# Patient Record
Sex: MALE | Race: WHITE | NOT HISPANIC OR LATINO | URBAN - METROPOLITAN AREA
[De-identification: names, ages, dates, MRNs, and addresses within clinical notes are randomized per-mention and may not be internally consistent; named-entity substitution may affect disease eponyms.]

---

## 2017-02-25 ENCOUNTER — EMERGENCY (EMERGENCY)
Facility: HOSPITAL | Age: 37
LOS: 1 days | Discharge: HOME | End: 2017-02-25
Attending: EMERGENCY MEDICINE | Admitting: EMERGENCY MEDICINE
Payer: COMMERCIAL

## 2017-02-25 VITALS
DIASTOLIC BLOOD PRESSURE: 78 MMHG | OXYGEN SATURATION: 98 % | HEART RATE: 81 BPM | TEMPERATURE: 98 F | SYSTOLIC BLOOD PRESSURE: 116 MMHG | RESPIRATION RATE: 17 BRPM

## 2017-02-25 DIAGNOSIS — H57.8 OTHER SPECIFIED DISORDERS OF EYE AND ADNEXA: ICD-10-CM

## 2017-02-25 PROCEDURE — 99283 EMERGENCY DEPT VISIT LOW MDM: CPT | Mod: 25

## 2017-02-25 PROCEDURE — 99283 EMERGENCY DEPT VISIT LOW MDM: CPT

## 2017-02-25 NOTE — ED PROVIDER NOTE - OBJECTIVE STATEMENT
35 y/o M tory s/p accidentally applying aluminium sulfate  to his right eye approx 1 hour ago . Parth Mcdaniel DO: 35 y/o M tory s/p accidentally applying aluminium sulfate 65% to his right eye approx 1 hour ago . He had been reaching for his abx eye drops prescribed by his PMD. Had immediate pain and irritation so called EMS. No visual changes. EMS called poison control en route, recommend irrigation.  Irrigation en route to hospital.

## 2017-02-25 NOTE — ED PROVIDER NOTE - PHYSICAL EXAMINATION
Parth Mcdaniel DO:   Gen: well appearing without any distress. AAOx3.  HEENT: bilateral eye erythema. PERRL, EOMI, atraumatic  Neck: supple  Heart: RRR, S1S2  Lungs: CTA bl. no w/r/r  Neuro: Grossly intact. Normal gait.    SLit lamp exam R eye: externally mild conj erythema. pupil equal and reactive. No fluorescein uptake, no foreign bodies visualized. no flare or cells visualized in AC. Parth Mcdaniel DO:   Gen: well appearing without any distress. AAOx3.  HEENT: bilateral eye erythema. PERRL, EOMI, atraumatic  Neuro: Grossly intact. Normal gait.    SLit lamp exam R eye: externally mild conj erythema. pupil equal and reactive. No fluorescein uptake, no foreign bodies visualized. no flare or cells visualized in AC.

## 2017-02-25 NOTE — ED PROVIDER NOTE - ATTENDING CONTRIBUTION TO CARE
36M accidentally put aluminum sulfate 65% in R eye while reaching for his eye drops.     On exam, b/l conj erythema.     A: Chemical Eye irritation  P: yelena lens irrigiation, tetracaine, check ph. will check with tox if additional treatment necessary. 36M accidentally put aluminum sulfate 65% in R eye while reaching for his eye drops.     On exam, b/l conj erythema.     A: Chemical Eye irritation  P: yelena lens irrigation, tetracaine, check ph. will check with tox if additional treatment necessary.

## 2017-02-25 NOTE — ED PROVIDER NOTE - PROGRESS NOTE DETAILS
Parth Mcdaniel DO: Pt states feels much better and wishes to go home. Will dc. Parth Mcdaniel DO: Pt states feels much better and wishes to go home. Will dc. PT states gave his abx eye drops to EMS, will provide with oflaxacin eye drops as was started by his PMD. FU pmd in 24-48 hours.

## 2017-02-25 NOTE — ED ADULT NURSE NOTE - OBJECTIVE STATEMENT
37 yo male A&OX3 presents to the ED with the c/o r eye burning. Pt states that he put aluminum sulfate 65% in his eye by accident. Pt states that he thought that he was putting his eye drops in. Currently c/o blurry vision and pain in eye. + eye tearing. no pus or blood.

## 2017-03-02 ENCOUNTER — APPOINTMENT (OUTPATIENT)
Dept: OPHTHALMOLOGY | Facility: CLINIC | Age: 37
End: 2017-03-02

## 2017-03-02 DIAGNOSIS — H16.291: ICD-10-CM

## 2017-10-31 ENCOUNTER — EMERGENCY (EMERGENCY)
Facility: HOSPITAL | Age: 37
LOS: 1 days | Discharge: HOME | End: 2017-10-31
Admitting: EMERGENCY MEDICINE
Payer: COMMERCIAL

## 2017-10-31 VITALS
SYSTOLIC BLOOD PRESSURE: 144 MMHG | OXYGEN SATURATION: 100 % | RESPIRATION RATE: 16 BRPM | HEART RATE: 97 BPM | DIASTOLIC BLOOD PRESSURE: 95 MMHG

## 2017-10-31 VITALS
HEART RATE: 88 BPM | SYSTOLIC BLOOD PRESSURE: 159 MMHG | TEMPERATURE: 98 F | DIASTOLIC BLOOD PRESSURE: 101 MMHG | OXYGEN SATURATION: 97 % | RESPIRATION RATE: 20 BRPM

## 2017-10-31 PROCEDURE — 99283 EMERGENCY DEPT VISIT LOW MDM: CPT | Mod: 25

## 2017-10-31 PROCEDURE — 99283 EMERGENCY DEPT VISIT LOW MDM: CPT

## 2017-10-31 NOTE — ED ADULT NURSE NOTE - OBJECTIVE STATEMENT
37 year old male alert and oriented x 4 came to the ED c/o foreign body in the right eye.  Patient said he felt something in his eye about 2 hours prior to arrival in the ED.  Patient said he was blinking constantly from irritation.  Patient irrigated his eye for about 2 hours and said while he was in triage the eye irritation went away. Patient said he wasn't sure what was in his eye and denies any discharge from the eye or visual changes.  No redness noted in the eye.  Patient's pupils are 3mm equal and reactive to light.  Patient has no other signs of injury or deformity noted.  Safety ensured.

## 2017-10-31 NOTE — ED PROVIDER NOTE - OBJECTIVE STATEMENT
11pm.  better:  irrigation.  worse - none.  context: was sitting at home when FB sensation began in eye.

## 2017-10-31 NOTE — ED PROVIDER NOTE - MEDICAL DECISION MAKING DETAILS
36 yo M, resolved FB sensation in L eye w/o acuity compromise.  Slit lamp exam unremarkable.  Patient feels better prior to MD exam.  Irrigated at home.  No Hx that increases concern for metal FB.  No FB or abrasion visualized on exam.  Impression:  FB R eye, removed prior to ED arrival.  Plan:  d/c home, return precautions.

## 2017-10-31 NOTE — ED PROVIDER NOTE - PHYSICAL EXAMINATION
OD:  20/25, OS:  20/25, both: 20/25.  Fluorescein:  no abrasion.  EOMI.  No FB on exam. OD:  20/25, OS:  20/25, both: 20/25.  Fluorescein:  no abrasion.  EOMI.  No FB on exam.  Slit Lamp:  no cell and flare.  PERRL.

## 2018-07-10 ENCOUNTER — TRANSCRIPTION ENCOUNTER (OUTPATIENT)
Age: 38
End: 2018-07-10

## 2020-11-22 ENCOUNTER — EMERGENCY (EMERGENCY)
Facility: HOSPITAL | Age: 40
LOS: 1 days | Discharge: HOME | End: 2020-11-22
Attending: EMERGENCY MEDICINE
Payer: COMMERCIAL

## 2020-11-22 VITALS
OXYGEN SATURATION: 100 % | SYSTOLIC BLOOD PRESSURE: 131 MMHG | WEIGHT: 220.02 LBS | DIASTOLIC BLOOD PRESSURE: 90 MMHG | RESPIRATION RATE: 18 BRPM | TEMPERATURE: 99 F | HEIGHT: 68 IN | HEART RATE: 105 BPM

## 2020-11-22 PROCEDURE — 99284 EMERGENCY DEPT VISIT MOD MDM: CPT

## 2020-11-22 PROCEDURE — 99283 EMERGENCY DEPT VISIT LOW MDM: CPT

## 2020-11-23 VITALS
TEMPERATURE: 99 F | HEART RATE: 107 BPM | RESPIRATION RATE: 17 BRPM | OXYGEN SATURATION: 100 % | SYSTOLIC BLOOD PRESSURE: 123 MMHG | DIASTOLIC BLOOD PRESSURE: 89 MMHG

## 2020-11-23 NOTE — ED PROVIDER NOTE - CARE PLAN
Principal Discharge DX:	Finger laceration  Goal:	damage to the bed of the nail, initial encounter, left index finger

## 2020-11-23 NOTE — ED PROVIDER NOTE - SKIN COLOR
very small laceration at base of L index finger nail and extending into volar aspect, about 1cm, very superficial. No tenderness over DIP, neurovascular intact, full ROM

## 2020-11-23 NOTE — ED PROVIDER NOTE - OBJECTIVE STATEMENT
41 y/o M p/w L 2nd finger injury. Pt reports he was cutting carrots 2 days ago when he cut his L 2nd finger. Last tetanus was less than 5 years ago.

## 2020-11-23 NOTE — ED ADULT NURSE NOTE - OBJECTIVE STATEMENT
40 Y M presents to the ED with a 2 lacerations on his L index finger. reports that yesterday he was cutting carrots 1/4 inch and cut under his finger nail and then today was using a rotator saw that goes 3000mph and cut where his cuticle 1/2 cm is but is unsure how deep it is. Lacerations are not actively bleeding. Reports that his tetanus shot is up to date. reports that he cleaned it with hydrogen peroxide, and called his PMD who told him to come here. On assessment, A&Ox4. Breathing spontaneously and unlabored on room air. Skin intact and normal for race. Pt safety maintained. Call bell within reach. Side rails in upward position. Seen and evaluated by MD. Awaiting dispo.

## 2020-11-23 NOTE — ED PROVIDER NOTE - PATIENT PORTAL LINK FT
You can access the FollowMyHealth Patient Portal offered by [facility] by registering at the following website: [email or web address]. By joining [employer]’s FollowMyHealth portal, you will also be able to view your health information using other applications (apps) compatible with our system.

## 2020-11-23 NOTE — ED PROVIDER NOTE - CLINICAL SUMMARY MEDICAL DECISION MAKING FREE TEXT BOX
41 y/o M p/w very small laceration at base of L index finger nail and extending into volar aspect about 1 cm. Very superficial, does not require suturing. Cleaned with normal saline, applied Bacitracin. Pt states UTD with tetanus.

## 2020-11-23 NOTE — ED ADULT NURSE NOTE - NSFALLRSKASSESSDT_ED_ALL_ED
[place] HIGHTOWER reviewed MRI C SPine results; noted changes in the neck and degenerative arthiritis, irriration of the nerves. Nothing severly pincing on the spinal cord so would advise continuing with EMG to check for Neuropathy.     If neck pain continues could try PT.     Since did not tolerate some medications could try Baclofen 10 MG nightly and rotate with Ibufpren to decrease inflammation.    October - schedule follow up.   
Had an MRI a month ago and dont know the DX yet.  Ilda ordered it.  Also has question on a medication he would like to try.  Still has stiff neck    Ask what pharmacy if you give him a medication to try  
Patient seen in May 2019 for neck pain and at least 1 year to a year and half duration of progressive BLE numbness sensation of stiffness and weakness.    EMG ordered and scheduled for 8/15/19 ? Neuropathic Findings   MRI -  C Spine - patient requesting results.     No follow up scheduled at this time.     Not tolerating Amitriptyline (ordered by PCP Aprill of this year)  Tried Flexeril without much relief.           
23-Nov-2020 00:39

## 2020-11-23 NOTE — ED PROVIDER NOTE - NS_ ATTENDINGSCRIBEDETAILS _ED_A_ED_FT
I reviewed the scribe's note and agree with the documented findings and plan of care.  Livia Alexander MD

## 2020-11-23 NOTE — ED PROVIDER NOTE - NSFOLLOWUPINSTRUCTIONS_ED_ALL_ED_FT
1. Return to ED for worsening, progressive or any other concerning symptoms   2. Follow up with your primary care doctor in 2-3days  3. Apply bacitracin teice per day, keep drive

## 2021-03-16 ENCOUNTER — EMERGENCY (EMERGENCY)
Facility: HOSPITAL | Age: 41
LOS: 1 days | Discharge: LEFT BEFORE DISCHARGE | End: 2021-03-16
Attending: EMERGENCY MEDICINE
Payer: COMMERCIAL

## 2021-03-16 VITALS
SYSTOLIC BLOOD PRESSURE: 130 MMHG | RESPIRATION RATE: 18 BRPM | OXYGEN SATURATION: 94 % | TEMPERATURE: 99 F | HEART RATE: 121 BPM | WEIGHT: 220.02 LBS | HEIGHT: 68 IN | DIASTOLIC BLOOD PRESSURE: 90 MMHG

## 2021-03-16 VITALS
DIASTOLIC BLOOD PRESSURE: 76 MMHG | SYSTOLIC BLOOD PRESSURE: 137 MMHG | HEART RATE: 105 BPM | OXYGEN SATURATION: 95 % | RESPIRATION RATE: 16 BRPM

## 2021-03-16 LAB
ALBUMIN SERPL ELPH-MCNC: 4.6 G/DL — SIGNIFICANT CHANGE UP (ref 3.3–5)
ALP SERPL-CCNC: 103 U/L — SIGNIFICANT CHANGE UP (ref 40–120)
ALT FLD-CCNC: 22 U/L — SIGNIFICANT CHANGE UP (ref 10–45)
ANION GAP SERPL CALC-SCNC: 15 MMOL/L — SIGNIFICANT CHANGE UP (ref 5–17)
APAP SERPL-MCNC: <15 UG/ML — SIGNIFICANT CHANGE UP (ref 10–30)
AST SERPL-CCNC: 19 U/L — SIGNIFICANT CHANGE UP (ref 10–40)
BASOPHILS # BLD AUTO: 0.03 K/UL — SIGNIFICANT CHANGE UP (ref 0–0.2)
BASOPHILS NFR BLD AUTO: 0.2 % — SIGNIFICANT CHANGE UP (ref 0–2)
BILIRUB SERPL-MCNC: 0.3 MG/DL — SIGNIFICANT CHANGE UP (ref 0.2–1.2)
BUN SERPL-MCNC: 15 MG/DL — SIGNIFICANT CHANGE UP (ref 7–23)
CALCIUM SERPL-MCNC: 10.1 MG/DL — SIGNIFICANT CHANGE UP (ref 8.4–10.5)
CHLORIDE SERPL-SCNC: 99 MMOL/L — SIGNIFICANT CHANGE UP (ref 96–108)
CO2 SERPL-SCNC: 24 MMOL/L — SIGNIFICANT CHANGE UP (ref 22–31)
CREAT SERPL-MCNC: 1.14 MG/DL — SIGNIFICANT CHANGE UP (ref 0.5–1.3)
EOSINOPHIL # BLD AUTO: 0.02 K/UL — SIGNIFICANT CHANGE UP (ref 0–0.5)
EOSINOPHIL NFR BLD AUTO: 0.1 % — SIGNIFICANT CHANGE UP (ref 0–6)
ETHANOL SERPL-MCNC: SIGNIFICANT CHANGE UP MG/DL (ref 0–10)
GLUCOSE SERPL-MCNC: 98 MG/DL — SIGNIFICANT CHANGE UP (ref 70–99)
HCT VFR BLD CALC: 43.7 % — SIGNIFICANT CHANGE UP (ref 39–50)
HGB BLD-MCNC: 14.1 G/DL — SIGNIFICANT CHANGE UP (ref 13–17)
IMM GRANULOCYTES NFR BLD AUTO: 0.4 % — SIGNIFICANT CHANGE UP (ref 0–1.5)
LYMPHOCYTES # BLD AUTO: 1.29 K/UL — SIGNIFICANT CHANGE UP (ref 1–3.3)
LYMPHOCYTES # BLD AUTO: 9.6 % — LOW (ref 13–44)
MCHC RBC-ENTMCNC: 29.7 PG — SIGNIFICANT CHANGE UP (ref 27–34)
MCHC RBC-ENTMCNC: 32.3 GM/DL — SIGNIFICANT CHANGE UP (ref 32–36)
MCV RBC AUTO: 92 FL — SIGNIFICANT CHANGE UP (ref 80–100)
MONOCYTES # BLD AUTO: 0.52 K/UL — SIGNIFICANT CHANGE UP (ref 0–0.9)
MONOCYTES NFR BLD AUTO: 3.9 % — SIGNIFICANT CHANGE UP (ref 2–14)
NEUTROPHILS # BLD AUTO: 11.47 K/UL — HIGH (ref 1.8–7.4)
NEUTROPHILS NFR BLD AUTO: 85.8 % — HIGH (ref 43–77)
NRBC # BLD: 0 /100 WBCS — SIGNIFICANT CHANGE UP (ref 0–0)
PLATELET # BLD AUTO: 312 K/UL — SIGNIFICANT CHANGE UP (ref 150–400)
POTASSIUM SERPL-MCNC: 3.8 MMOL/L — SIGNIFICANT CHANGE UP (ref 3.5–5.3)
POTASSIUM SERPL-SCNC: 3.8 MMOL/L — SIGNIFICANT CHANGE UP (ref 3.5–5.3)
PROT SERPL-MCNC: 7.8 G/DL — SIGNIFICANT CHANGE UP (ref 6–8.3)
RBC # BLD: 4.75 M/UL — SIGNIFICANT CHANGE UP (ref 4.2–5.8)
RBC # FLD: 12.1 % — SIGNIFICANT CHANGE UP (ref 10.3–14.5)
SALICYLATES SERPL-MCNC: <2 MG/DL — LOW (ref 15–30)
SARS-COV-2 RNA SPEC QL NAA+PROBE: SIGNIFICANT CHANGE UP
SODIUM SERPL-SCNC: 138 MMOL/L — SIGNIFICANT CHANGE UP (ref 135–145)
TROPONIN T, HIGH SENSITIVITY RESULT: <6 NG/L — SIGNIFICANT CHANGE UP (ref 0–51)
WBC # BLD: 13.39 K/UL — HIGH (ref 3.8–10.5)
WBC # FLD AUTO: 13.39 K/UL — HIGH (ref 3.8–10.5)

## 2021-03-16 PROCEDURE — 85025 COMPLETE CBC W/AUTO DIFF WBC: CPT

## 2021-03-16 PROCEDURE — 71045 X-RAY EXAM CHEST 1 VIEW: CPT

## 2021-03-16 PROCEDURE — 99285 EMERGENCY DEPT VISIT HI MDM: CPT

## 2021-03-16 PROCEDURE — U0005: CPT

## 2021-03-16 PROCEDURE — 80053 COMPREHEN METABOLIC PANEL: CPT

## 2021-03-16 PROCEDURE — 93010 ELECTROCARDIOGRAM REPORT: CPT | Mod: 59

## 2021-03-16 PROCEDURE — 99285 EMERGENCY DEPT VISIT HI MDM: CPT | Mod: 25

## 2021-03-16 PROCEDURE — 80307 DRUG TEST PRSMV CHEM ANLYZR: CPT

## 2021-03-16 PROCEDURE — 71045 X-RAY EXAM CHEST 1 VIEW: CPT | Mod: 26

## 2021-03-16 PROCEDURE — 84484 ASSAY OF TROPONIN QUANT: CPT

## 2021-03-16 PROCEDURE — U0003: CPT

## 2021-03-16 NOTE — ED ADULT NURSE NOTE - CAS ED AMA FORM SIGNED YN
PT was provided information by ED MD, both MD and residents were in the room educating the patient./Yes

## 2021-03-16 NOTE — ED ADULT TRIAGE NOTE - CCCP TRG CHIEF CMPLNT
OD/shot cocaine into both arms 2 hours ago possible seizure/OD/shot cocaine into both arms 2 hours ago

## 2021-03-16 NOTE — ED PROVIDER NOTE - OBJECTIVE STATEMENT
40 male, Hx: Bipolar Disorder, Anxiety, ADHD (on Lamotrigine for Bipolar, Adderall for ADHD), Substance use disorder - presents with possible seizure like episode. Pt BIBEMS, where pt was picked up at his office found to be injecting reported cocaine into his veins ([facility] reports it is cocaine) at 6:30 AM this morning. Pt reports he was using drugs all night, when he took his last injection at 6:30 - his friend noted him to be sweaty, shaking, and possibly foaming at the mouth. Pt reports "she is freaking out, I've never seized, did not seize this morning, did not lose consciousness, I remember everything and was just feeling a little racy." Collateral attempted to be obtained by his friend who is also in the ED for med eval - she refused to answer any questions.     Pt denies any symptoms at present; denies any fevers, chills, cough, CP, palpitations, SOB, dizziness, abdominal pain, nausea, vomiting, diarrhea, constipation, bloody stools, dysuric symptoms. Denies any LOC. Denies any other toxic coingestants. Admits to ONLY drug abuse, relapsed after 5 years.

## 2021-03-16 NOTE — ED ADULT NURSE NOTE - OBJECTIVE STATEMENT
39 yo m with a pmhx of ADHD, bipolar disorder, obesity presents to ED via EMS after intravenous cocaine overdose. Pt reports history of cocaine addiction, was sober for 5 years before relapsing in the recent months. Pt was using cocaine with an acquaintance who he says was worried about him when he "got too speedy", so she called 911. Pt states he only used cocaine, denies any other drug use or alcohol. Pt states when he initially injected cocaine he felt his heart racing, but now feels fine and has no complaints. Pt denies losing consciousness. Pt agitated d/t present situation, and is stressed about his job. 41 yo m with a pmhx of ADHD, bipolar disorder, obesity presents to ED via EMS with police escort after intravenous cocaine use multiple times today b/l forearms. Pt reports history of cocaine addiction, was sober for 5 years before relapsing in the recent months. Pt was using cocaine with an acquaintance who he says was worried about him when he "got too speedy", so she called 911. Pt states he only used cocaine, denies any other drug use or alcohol. Pt states when he initially injected cocaine he felt his heart racing, but now feels fine and has no complaints. Pt denies losing consciousness. Pt agitated d/t present situation, and is stressed about his job. Placed pt on cardiac monitoring, bed to lowest position.

## 2021-03-16 NOTE — ED PROVIDER NOTE - PHYSICAL EXAMINATION
GEN - NAD; non-toxic; A+Ox3, speaking full sentences, steady gait   HENT - (+) Anterolateral Tongue Laceration; NC/AT, No visible Ecchymosis, No Abrasions, No Lac/Tears, MMM, no discharge  EYES - EOMI, PERRL, no conjunctival pallor, no scleral icterus  NECK - Neck supple, No LAD, No Swelling  PULM - CTA B/L,  symmetric breath sounds  CV -  Tachy, S1 S2, no murmurs 2+ Pulses B/L UE  GI - NT/ND, soft, no guarding, no rebound, no masses    MSK/EXT- no CVA tenderness; no edema, no gross deformity, warm and well perfused, no calf tenderness/swelling/erythema   SKIN - no rash or bruising  NEUROLOGIC - alert, CN2-12 intact, sensation intact, moving all 4 ext with 5/5 Strength

## 2021-03-16 NOTE — ED ADULT NURSE NOTE - NSIMPLEMENTINTERV_GEN_ALL_ED
Implemented All Universal Safety Interventions:  [place] to call system. Call bell, personal items and telephone within reach. Instruct patient to call for assistance. Room bathroom lighting operational. Non-slip footwear when patient is off stretcher. Physically safe environment: no spills, clutter or unnecessary equipment. Stretcher in lowest position, wheels locked, appropriate side rails in place.

## 2021-03-16 NOTE — ED PROVIDER NOTE - CARE PLAN
Principal Discharge DX:	Accidental drug overdose, initial encounter  Secondary Diagnosis:	Seizure-like activity

## 2021-03-16 NOTE — ED PROVIDER NOTE - PROGRESS NOTE DETAILS
Resident Otis: pt requesting to leave AMA, explained to the patient that he would benefit from Neurologic evaluation (given likely seizure like episode) and further monitoring for his elevated HR.    The pt is clinically sober, AA&Ox3, free from distracting injury.  Throughout our interactions in the ED today, the pt has demonstrated concrete thinking/reasoning, has maintained an orderly/reasonable conversation, appears to have intact insight/judgment/reason and therefore in our opinion has capacity to make decisions.      Given the pt’s presentation, we communicated our concern for Seizure and elevated HR in laymans terms. The pt verbalized an understanding of our worries. We’ve told the patient that the ED evaluation is incomplete & many troublesome conditions haven’t been r/o. We have discussed the need for further ED w/u so we can get more information about substance abuse, seizure disorder, and elevated hear rates.  We have discussed the range of possible dx, potential testing & tx options.  We’ve made  numerous efforts to prevent the pt from leaving AMA.  Our discussions included the potential outcomes of leaving AMA, including worsening of their condition, becoming permanently disabled/in pain/critically ill, or death.  Despite these efforts, we were unable to convince the pt to stay.  The pt is refusing any  further care and is leaving against medical advice.     We have attempted to offer tx/rx/guidance for any dangerous conditions which are most likely and/or dangerous.  We have answered all questions and have implored the pt to return ASAP to complete the w/u.  A staff member witnessed the patient consenting to AMA.    The patient understands that as a consequence of this decision they may be at risk for clinical deterioration including permanent disability and death and verbalized this understanding. Clear return precautions were discussed. The patient was urged to seek follow-up care, with appropriate referrals made as needed. Attending MD Dominguez: Patient would like to leave now Against Medical Advice (AMA). Pt is currently sober, is A+Ox3 with full decision-making capacity. Pt understands that by leaving now, they are leaving Against Medical Advice (AMA) prior to an incomplete w/u. I discussed the potential risks and alternatives to leaving AMA. Risks include and are not limited to: MI, cardiac arrest, pulmonary arrest, heart failure, need for further hospitalizations, procedures and/or surgeries, need for further medical care, permanent irreversible disability, infection, bleeding, sepsis. Pt understands these risks and despite full knowledge of these risks, still wants to leave AMA. Pt advised to follow-up with their PCP tomorrow and to return to the nearest ED (including our ED) if they change their mind and wish to come back for further work-up, or if they begin to feel worse. Pt expressed understanding of recommendations. All questions answered. Drug treatment resources provided and shown to patient within his discharge instructions. Again, pt denies SI/HI and admits that he has been in touch with his psychiatrist, appears remorseful of his drug use and wants to "get clean."

## 2021-03-16 NOTE — ED PROVIDER NOTE - CHIEF COMPLAINT
The patient is a 40y Male complaining of  The patient is a 40y Male complaining of possible seizure.

## 2021-03-16 NOTE — ED PROVIDER NOTE - NSFOLLOWUPINSTRUCTIONS_ED_ALL_ED_FT
You were seen and evaluated in the Emergency Department for your substance abuse and possible seizure like activity. You were evaluated clinically and with laboratory studies.    - YOU SHOULD REFRAIN FROM DRIVING, AS IT IS POSSIBLE YOU HAD A SEIZURE.    - STOP USING COCAINE OR ANY OTHER NON-PRESCRIBED MEDICATIONS.     You requested to leave against medical advice. We strongly recommend you follow up with one of our Psychiatric consultants (or your own) for further evaluation of your symptoms by calling the following number to make an appointment:    [facility]  Behavioral Crisis Center  [phone] [place] [id number] [phone]  WALK IN TIMES: M-F 9 AM - 7PM    Amadou Olivia (DO)  Neurology; Vascular Neurology  [address], [address]  [place] [address]  Phone: [phone]  Fax: [phone]      Should you develop new or worsening chest pain, shortness of breath, fevers, chills, nausea, vomiting, diarrhea, seizures, or constipation; suicidal or homicidal thoughts - please return to the ED for immediate evaluation.     We also strongly encourage you make an appointment with your Primary Care Physician for a comprehensive evaluation of your health. You were seen and evaluated in the Emergency Department for your substance abuse and possible seizure like activity. You were evaluated clinically and with laboratory studies.    - YOU SHOULD REFRAIN FROM DRIVING, AS IT IS POSSIBLE YOU HAD A SEIZURE, YOU NEED TO FOLLOW UP WITH A DOCTOR OR A NEUROLOGIST.    - STOP USING COCAINE OR ANY OTHER NON-PRESCRIBED MEDICATIONS.     - YOU WERE TESTED FOR CORONA VIRUS, WE RECOMMEND YOU QUARANTINE UNTIL YOU ARE SYMPTOM FREE OR HAVE TWO CONSECUTIVE NEGATIVE TESTS.     You requested to leave against medical advice. We strongly recommend you follow up with one of our Psychiatric consultants (or your own) for further evaluation of your symptoms by calling the following number to make an appointment:    [facility]  Behavioral Crisis Center  [address] [place] [address] [phone]  WALK IN TIMES: M-F 9 AM - 7PM    Amadou Olivia (DO)  Neurology; Vascular Neurology  [address], [address]  [place] [address]  Phone: [phone]  Fax: [phone]      Should you develop new or worsening chest pain, shortness of breath, fevers, chills, nausea, vomiting, diarrhea, seizures, or constipation; suicidal or homicidal thoughts - please return to the ED for immediate evaluation.     We also strongly encourage you make an appointment with your Primary Care Physician for a comprehensive evaluation of your health.

## 2021-03-16 NOTE — ED ADULT NURSE REASSESSMENT NOTE - NS ED NURSE REASSESS COMMENT FT1
PT reports he is ready to go home, states he is ready to go home. MD made aware, will remove IV's as per MD.

## 2021-03-16 NOTE — ED PROVIDER NOTE - ATTENDING CONTRIBUTION TO CARE
I saw and evaluated patient with resident. I discussed H+P and MDM with resident. I agree with the statements made by the resident unless otherwise noted.    The care of this patient was in support of the [facility] countermeasures to Covid-19.

## 2021-03-16 NOTE — ED PROVIDER NOTE - CLINICAL SUMMARY MEDICAL DECISION MAKING FREE TEXT BOX
ELVIN Dominguez MD: Pt is a 39 y/o male with bipolar d/o who is BIB EMS for overdose, possible seizure. Per patient, he is an  with previous h/o drug abuse, clean for 5 yrs, recently relapsed 1 month ago, who injected cocaine IV last night, last dose 6:30am. Pt was staying up late at his office to finish up work with a co-worker who has a h/o drug abuse and they shot cocaine into each other's veins. Per co-worker (who is also an ER patient currently for drug intoxication) she became concerned about his condition, saw patient "foaming at the mouth" and called 911. Pt reports that he was awake and alert the entire time, does not believe that he had a seizure. He admits to feeling heart palpitations and racing, which has since improved. Denies CP, SOB, cough, f/c, known sick contacts, HA, falls/trauma, back pain, focal numbness/weakness. Plan: cardiac monitor, 12-lead, basic labs, trop, tox, cxr, continue to monitor

## 2021-03-16 NOTE — ED PROVIDER NOTE - PATIENT PORTAL LINK FT
You can access the FollowMyHealth Patient Portal offered by [facility] by registering at the following website: [email or web address]. By joining [employer]’s FollowMyHealth portal, you will also be able to view your health information using other applications (apps) compatible with our system.

## 2024-05-28 NOTE — ED ADULT NURSE NOTE - NS ED NOTE  TALK SOMEONE YN
"Pt has viewed message via [employer]:  \"Seen by patient Mackenzie BRIAN Clarissa on 5/27/2024  6:13 PM \"    Azul Jang RN  [facility]    " No